# Patient Record
Sex: MALE | Race: WHITE | NOT HISPANIC OR LATINO | URBAN - METROPOLITAN AREA
[De-identification: names, ages, dates, MRNs, and addresses within clinical notes are randomized per-mention and may not be internally consistent; named-entity substitution may affect disease eponyms.]

---

## 2023-02-08 ENCOUNTER — TELEPHONE (OUTPATIENT)
Dept: UROLOGY | Facility: AMBULATORY SURGERY CENTER | Age: 63
End: 2023-02-08

## 2023-02-08 NOTE — TELEPHONE ENCOUNTER
Please Triage  New Patient    What is the reason for the patient’s appointment? Pt is following Dr Joceline Snyder   Patient has an elevated PSA     What office location does the patient prefer? [facility]    Imaging/Lab Results:    Do we accept the patient's insurance or is the patient Self-Pay? Insurance Provider: [employer]  Plan Type/Number:  Member ID#: Has the patient had any previous Urologist(s)? Yes [facility]    Have patient records been requested? If not are records showing in Epic: epic    Has the patient had any outside testing done? Does the patient have a personal history of cancer?  no

## 2023-04-20 PROBLEM — R97.20 ELEVATED PSA: Status: ACTIVE | Noted: 2023-04-20

## 2023-04-20 PROBLEM — N40.1 BENIGN PROSTATIC HYPERPLASIA WITH NOCTURIA: Status: ACTIVE | Noted: 2023-04-20

## 2023-04-20 PROBLEM — R35.1 BENIGN PROSTATIC HYPERPLASIA WITH NOCTURIA: Status: ACTIVE | Noted: 2023-04-20

## 2023-10-20 ENCOUNTER — TELEPHONE (OUTPATIENT)
Dept: UROLOGY | Facility: CLINIC | Age: 63
End: 2023-10-20

## 2023-10-25 ENCOUNTER — OFFICE VISIT (OUTPATIENT)
Dept: UROLOGY | Facility: CLINIC | Age: 63
End: 2023-10-25
Payer: COMMERCIAL

## 2023-10-25 VITALS
HEIGHT: 71 IN | SYSTOLIC BLOOD PRESSURE: 120 MMHG | HEART RATE: 67 BPM | BODY MASS INDEX: 37.1 KG/M2 | OXYGEN SATURATION: 99 % | DIASTOLIC BLOOD PRESSURE: 78 MMHG | TEMPERATURE: 97.6 F | RESPIRATION RATE: 18 BRPM | WEIGHT: 265 LBS

## 2023-10-25 DIAGNOSIS — R97.20 ELEVATED PSA: Primary | ICD-10-CM

## 2023-10-25 DIAGNOSIS — N40.1 BENIGN PROSTATIC HYPERPLASIA WITH NOCTURIA: ICD-10-CM

## 2023-10-25 DIAGNOSIS — R35.1 BENIGN PROSTATIC HYPERPLASIA WITH NOCTURIA: ICD-10-CM

## 2023-10-25 PROCEDURE — 99213 OFFICE O/P EST LOW 20 MIN: CPT | Performed by: UROLOGY

## 2023-10-25 RX ORDER — TADALAFIL 5 MG/1
5 TABLET ORAL DAILY PRN
Qty: 90 TABLET | Refills: 3 | Status: SHIPPED | OUTPATIENT
Start: 2023-10-25

## 2023-10-25 NOTE — PROGRESS NOTES
UROLOGY PROGRESS NOTE         NAME: Sherron Coley  AGE: 66 y.o. SEX: male  : 1960   MRN: [id number]    DATE: 10/25/2023  TIME: 10:41 AM    Assessment and Plan      Impression:   1. Elevated PSA  -     PSA Total, Diagnostic; Future  -     PSA Total, Diagnostic; Future; Expected date: 2024  -     tadalafil (CIALIS) 5 MG tablet; Take 1 tablet (5 mg total) by mouth daily as needed for erectile dysfunction    2. Benign prostatic hyperplasia with nocturia  -     PSA Total, Diagnostic; Future  -     PSA Total, Diagnostic; Future; Expected date: 2024  -     tadalafil (CIALIS) 5 MG tablet; Take 1 tablet (5 mg total) by mouth daily as needed for erectile dysfunction    PSA down to 9.2. Doing well from a urologic standpoint with erectile dysfunction and BPH.  Plan: He will continue to monitor his PSA twice a year we will see him in 1 year. Consider an additional MRI of the prostate before biopsies. Chief Complaint     Chief Complaint   Patient presents with    Follow-up     23 PSA done it was 9.2     History of Present Illness     HPI: Sherron Coley is a 66y.o. year old male who presents with history of elevated PSA history of family history of prostate cancer. History of negative prostate biopsies in 2019. History of a negative MRI a year and a half ago. Doing well clinically on Cialis 5 mg daily. He is able to reach orgasm but has trouble reaching it. That is his main complaint. He has nocturia once a night. His flow is better. His urgency is a little better. He feels that he empties. Jaycee Angles PSA was 10.2 now down to 9.2.             The following portions of the patient's history were reviewed and updated as appropriate: allergies, current medications, past family history, past medical history, past social history, past surgical history and problem list.  Past Medical History:   Diagnosis Date    Benign prostatic hyperplasia     Elevated PSA      Past Surgical History: Procedure Laterality Date    COLONOSCOPY W/ POLYPECTOMY       shoulder  Review of Systems     Const: Denies chills, fever and weight loss. CV: Denies chest pain. Resp: Denies SOB. GI: Denies abdominal pain, nausea and vomiting. : Denies symptoms other than stated above. Musculo: Denies back pain. Objective   /78 (BP Location: Left arm, Patient Position: Sitting, Cuff Size: Large)   Pulse 67   Temp 97.6 °F (36.4 °C) (Tympanic)   Resp 18   Ht 5' 11" (1.803 m)   Wt 120 kg (265 lb)   SpO2 99%   BMI 36.96 kg/m²     Physical Exam  Const: Appears healthy and well developed. No signs of acute distress present. Resp: Respirations are regular and unlabored. CV: Rate is regular. Rhythm is regular. Abdomen: Abdomen is soft, nontender, and nondistended. Kidneys are not palpable. : Normal penis testicles epididymis and scrotum. No hernias. Prostate 1+ benign. Psych: Patient's attitude is cooperative.  Mood is normal. Affect is normal.    Current Medications     Current Outpatient Medications:     atorvastatin (LIPITOR) 20 mg tablet, Take 20 mg by mouth daily, Disp: , Rfl:     lisinopril (ZESTRIL) 10 mg tablet, Take 10 mg by mouth daily, Disp: , Rfl:     tadalafil (CIALIS) 5 MG tablet, Take 1 tablet (5 mg total) by mouth daily as needed for erectile dysfunction, Disp: 90 tablet, Rfl: 3    metroNIDAZOLE (METROGEL) 1 % gel, Apply topically (Patient not taking: Reported on 10/25/2023), Disp: , Rfl:         Harjit Olmos MD

## 2024-01-31 ENCOUNTER — TELEPHONE (OUTPATIENT)
Age: 64
End: 2024-01-31

## 2024-01-31 NOTE — TELEPHONE ENCOUNTER
Patient's PCP office called to request a copy of the most recent office visit note of 10/25/2023.    Pt signed a consent for release of records which will be faxed by the office to us.    Fax [phone]

## 2024-04-30 ENCOUNTER — TELEPHONE (OUTPATIENT)
Dept: UROLOGY | Facility: CLINIC | Age: 64
End: 2024-04-30

## 2024-05-01 NOTE — TELEPHONE ENCOUNTER
Spoke with pt, he will get his psa closer to his next appt. Aware Dr. D'Amico would like it twice a year. Verbalized understanding. Nothing else needed at this time.

## 2024-05-01 NOTE — TELEPHONE ENCOUNTER
Patient is wondering if doctor needs this done right away or closer to his next apt. He states his ins will only cover it once a year.     CB: [phone]

## 2024-11-06 ENCOUNTER — OFFICE VISIT (OUTPATIENT)
Dept: UROLOGY | Facility: CLINIC | Age: 64
End: 2024-11-06
Payer: COMMERCIAL

## 2024-11-06 VITALS
HEIGHT: 71 IN | WEIGHT: 268.4 LBS | HEART RATE: 66 BPM | TEMPERATURE: 98 F | DIASTOLIC BLOOD PRESSURE: 70 MMHG | BODY MASS INDEX: 37.57 KG/M2 | SYSTOLIC BLOOD PRESSURE: 128 MMHG | OXYGEN SATURATION: 99 %

## 2024-11-06 DIAGNOSIS — R97.20 ELEVATED PSA: ICD-10-CM

## 2024-11-06 DIAGNOSIS — R35.1 BENIGN PROSTATIC HYPERPLASIA WITH NOCTURIA: Primary | ICD-10-CM

## 2024-11-06 DIAGNOSIS — N40.1 BENIGN PROSTATIC HYPERPLASIA WITH NOCTURIA: Primary | ICD-10-CM

## 2024-11-06 LAB
SL AMB  POCT GLUCOSE, UA: NORMAL
SL AMB LEUKOCYTE ESTERASE,UA: NORMAL
SL AMB POCT BILIRUBIN,UA: NORMAL
SL AMB POCT BLOOD,UA: NORMAL
SL AMB POCT CLARITY,UA: CLEAR
SL AMB POCT COLOR,UA: YELLOW
SL AMB POCT KETONES,UA: NORMAL
SL AMB POCT NITRITE,UA: NORMAL
SL AMB POCT PH,UA: 6
SL AMB POCT SPECIFIC GRAVITY,UA: >=1.03
SL AMB POCT URINE PROTEIN: NORMAL
SL AMB POCT UROBILINOGEN: 0.2

## 2024-11-06 PROCEDURE — 81003 URINALYSIS AUTO W/O SCOPE: CPT | Performed by: UROLOGY

## 2024-11-06 PROCEDURE — 99214 OFFICE O/P EST MOD 30 MIN: CPT | Performed by: UROLOGY

## 2024-11-06 RX ORDER — LISINOPRIL AND HYDROCHLOROTHIAZIDE 10; 12.5 MG/1; MG/1
1 TABLET ORAL DAILY
COMMUNITY
Start: 2024-09-15

## 2024-11-06 NOTE — PROGRESS NOTES
UROLOGY PROGRESS NOTE         NAME: Terry Etzweiler  AGE: 64 y.o. SEX: male  : 1960   MRN: [id number]    DATE: 2024  TIME: 11:21 AM    Assessment and Plan      Impression:   1. Benign prostatic hyperplasia with nocturia  -     POCT urine dip auto non-scope  2. Elevated PSA  Patient's PSA has been slowly rising.  Now 12.5.  Options discussed.     Plan: Repeat MRI and consider biopsies based on that study.  Continue Cialis 5 mg daily    Chief Complaint     Chief Complaint   Patient presents with    Elevated PSA    Follow-up     History of Present Illness     HPI: Terry Etzweiler is a 64 y.o. year old male who presents with history of negative biopsies in .  PSA was rising some.  PSA 6.5 in .  MRI at that time revealed a PI-RADS score of 2.  Prostate volume of 65 cc.  He has been followed along expectantly.  He does have some ejaculatory dysfunction.  He has been on some Cialis which does help his erections.  And helps his nocturia little bit.  He has nocturia 0-1 time per night.  Voids every few hours during the day.  No significant voiding dysfunction.  No urgency.  No hematuria.  Most recent PSA 12.5.  This is up from 10.0  Currently on Cialis 5 mg daily              The following portions of the patient's history were reviewed and updated as appropriate: allergies, current medications, past family history, past medical history, past social history, past surgical history and problem list.  Past Medical History:   Diagnosis Date    Benign prostatic hyperplasia     Elevated PSA     High cholesterol      Past Surgical History:   Procedure Laterality Date    ARTHROSCOPIC REPAIR ACL Left     COLONOSCOPY W/ POLYPECTOMY       shoulder  Review of Systems     Const: Denies chills, fever and weight loss.  CV: Denies chest pain.  Resp: Denies SOB.  GI: Denies abdominal pain, nausea and vomiting.  : Denies symptoms other than stated above.  Musculo: Denies back pain.    Objective   /70    "Pulse 66   Temp 98 °F (36.7 °C)   Ht 5' 11\" (1.803 m)   Wt 122 kg (268 lb 6.4 oz)   SpO2 99%   BMI 37.43 kg/m²     Physical Exam  Const: Appears healthy and well developed. No signs of acute distress present.  Resp: Respirations are regular and unlabored.   CV: Rate is regular. Rhythm is regular.  Abdomen: Abdomen is soft, nontender, and nondistended. Kidneys are not palpable.  : Prostate 1+ benign.  Psych: Patient's attitude is cooperative. Mood is normal. Affect is normal.    Current Medications     Current Outpatient Medications:     atorvastatin (LIPITOR) 20 mg tablet, Take 20 mg by mouth daily, Disp: , Rfl:     lisinopril (ZESTRIL) 10 mg tablet, Take 10 mg by mouth daily, Disp: , Rfl:     lisinopril-hydrochlorothiazide (PRINZIDE,ZESTORETIC) 10-12.5 MG per tablet, Take 1 tablet by mouth daily, Disp: , Rfl:     tadalafil (CIALIS) 5 MG tablet, Take 1 tablet (5 mg total) by mouth daily as needed for erectile dysfunction (Patient taking differently: Take 5 mg by mouth in the morning), Disp: 90 tablet, Rfl: 3    metroNIDAZOLE (METROGEL) 1 % gel, Apply topically (Patient not taking: Reported on 10/25/2023), Disp: , Rfl:         Frank D'Amico, MD        "

## 2025-01-21 ENCOUNTER — TELEPHONE (OUTPATIENT)
Age: 65
End: 2025-01-21

## 2025-01-21 DIAGNOSIS — R35.1 BENIGN PROSTATIC HYPERPLASIA WITH NOCTURIA: Primary | ICD-10-CM

## 2025-01-21 DIAGNOSIS — N40.1 BENIGN PROSTATIC HYPERPLASIA WITH NOCTURIA: Primary | ICD-10-CM

## 2025-01-21 NOTE — TELEPHONE ENCOUNTER
Patient was returning a call to the office. Per the previous encounter, relay ed the message to the patient.     Could not give him further details because the orders were no in the chart for the labs. Can those please be added?    Patient will try to get the labs done tomorrow. He can not get it prior to the MRI because the appt is first thing in the morning around 7 am

## 2025-01-21 NOTE — TELEPHONE ENCOUNTER
Called and left message for patient to call the office back. Please relay message to patient. Patient would need to have his blood work done prior to the MRI. Please relay message to patient. Patient can try and ask if he can have lab work done earlier the day of his MRI. This is a requirement of where he is having the MRI done at. IF he is unable to have the lab work done he will have to reschedule the MRI and follow up appointment.

## 2025-01-21 NOTE — TELEPHONE ENCOUNTER
Patient returned missed call and stated that he rescheduled the MRI to 4/29 due to not having enough time to complete blood work.    Pt states he is having the MRI with [place] [facility].

## 2025-01-21 NOTE — TELEPHONE ENCOUNTER
Patrice calling as patient is scheduled on 01/23/2025 for MRI prostate multiparametric wo w contrast (Order [id number])   Patient was not aware he needed blood work prior to this test and has not had it done an dis unable to get it done due to his job-per Patrice.     Patrice inquiring if there is any alternative or if we need to reschedule. Please advise.     Patrice- [facility] union deposit phone number: [phone]

## 2025-01-21 NOTE — TELEPHONE ENCOUNTER
Left message for pt to return call, he needs to have a BUN/Creat drawn before his MRI. Where is he having the MRI done? I realize he is having the MRI at 7am but maybe he can call them and explain that he will need a later appt if they require the labs be done prior. Or he can go for the labs today? Or he will most likely need to r/s his MRI appt.     I have placed the orders in his chart.

## 2025-01-22 NOTE — TELEPHONE ENCOUNTER
Pt really does not want to have to travel to [place] or [place] for the MRI because its 2 hrs from him .  Do they do the MRI in [place] [employer]?  He will go there is possible.      Right now he is scheduled at [facility] for April 29th.

## 2025-01-22 NOTE — TELEPHONE ENCOUNTER
Pt scheduled for MRI with [facility] on 2/11, and virtual visit on 2/18. Pt works at [employer] he would like to make sure nurse gives him time to get outside to have call it is very loud where he works.

## 2025-01-22 NOTE — TELEPHONE ENCOUNTER
He is fairly young in age. PSA rising, now up to 12.  was 6.5 in 2021, MRI then was PI-RADS 2, prostate volume 65 cc.  Also had a history of negative biopsies.  On Cialis for some ejac dysfunction.     Is this study rescheduled to a convenient time for him? Or, is this something the facility is having trouble accommodating?

## 2025-01-22 NOTE — TELEPHONE ENCOUNTER
Left message for pt to call office back. Usually [facility] mri are scheduled pretty far out. Please find out if pt would like to have MRI done in [facility].

## 2025-02-11 ENCOUNTER — HOSPITAL ENCOUNTER (OUTPATIENT)
Dept: MRI IMAGING | Facility: HOSPITAL | Age: 65
Discharge: HOME | End: 2025-02-11
Attending: UROLOGY
Payer: COMMERCIAL

## 2025-02-11 DIAGNOSIS — R35.1 BENIGN PROSTATIC HYPERPLASIA WITH NOCTURIA: ICD-10-CM

## 2025-02-11 DIAGNOSIS — N40.1 BENIGN PROSTATIC HYPERPLASIA WITH NOCTURIA: ICD-10-CM

## 2025-02-11 DIAGNOSIS — R97.20 ELEVATED PSA: ICD-10-CM

## 2025-02-11 PROCEDURE — 72197 MRI PELVIS W/O & W/DYE: CPT

## 2025-02-11 PROCEDURE — A9585 GADOBUTROL INJECTION: HCPCS | Performed by: UROLOGY

## 2025-02-11 PROCEDURE — 76377 3D RENDER W/INTRP POSTPROCES: CPT

## 2025-02-11 RX ORDER — GADOBUTROL 604.72 MG/ML
12 INJECTION INTRAVENOUS
Status: COMPLETED | OUTPATIENT
Start: 2025-02-11 | End: 2025-02-11

## 2025-02-11 RX ADMIN — GADOBUTROL 12 ML: 604.72 INJECTION INTRAVENOUS at 13:07

## 2025-02-14 ENCOUNTER — RESULTS FOLLOW-UP (OUTPATIENT)
Dept: UROLOGY | Facility: CLINIC | Age: 65
End: 2025-02-14

## 2025-02-14 NOTE — TELEPHONE ENCOUNTER
----- Message from Frank D'Amico, MD sent at 2/14/2025 12:57 PM EST -----  Please give him a copy of his MRI.  We will discuss at his visit next week.  He has a phi RADS score of 3 giving him intermediate risk for having prostate cancer.  He should keep his appointment.  Thank you  ----- Message -----  From: Interface, Radiology Results In  Sent: 2/12/2025  11:36 PM EST  To: Frank D'Amico, MD

## 2025-02-18 ENCOUNTER — TELEPHONE (OUTPATIENT)
Age: 65
End: 2025-02-18

## 2025-02-18 ENCOUNTER — TELEMEDICINE (OUTPATIENT)
Dept: UROLOGY | Facility: CLINIC | Age: 65
End: 2025-02-18
Payer: COMMERCIAL

## 2025-02-18 DIAGNOSIS — R97.20 ELEVATED PSA: Primary | ICD-10-CM

## 2025-02-18 PROCEDURE — 99213 OFFICE O/P EST LOW 20 MIN: CPT | Performed by: UROLOGY

## 2025-02-18 NOTE — TELEPHONE ENCOUNTER
Patient was calling in because he stated he missed a call from the office again. He tried to answer but it would not connect.     Placed patient on hold to reach out to the office via teams. Patient disconnected call because I could even ask the office if they called him again.

## 2025-02-18 NOTE — Clinical Note
Myranda please set him up for me to do the biopsies here in [place].  Transrectal ultrasound with prostate biopsies.  Sedation.  Visit with me 1 week later to discuss results.

## 2025-02-18 NOTE — PROGRESS NOTES
UROLOGY PROGRESS NOTE         NAME: Terry Etzweiler  AGE: 64 y.o. SEX: male  : 1960   MRN: [id number]    DATE: 2025  TIME: 2:20 PM    Assessment and Plan      Impression:   1. Elevated PSA  -     Case request operating room: TRANSRECTAL NEEDLE BIOPSY PROSTATE (TRNBP); Standing  -     Case request operating room: TRANSRECTAL NEEDLE BIOPSY PROSTATE (TRNBP)  Virtual visit with the patient.  PSA is elevated.  He has a phi RADS score and his MRI of 3.  Previous biopsies negative.  He does have an elevated risk for having prostate cancer based on his rising PSA and MRI.  He is agreeable to having repeat biopsies.  We will do a cognitive fusion with random biopsies.  Risks discussed.  Will do this with sedation at the hospital.     Plan: Transrectal ultrasound of the prostate with ultrasound-guided biopsy of the prostate with a cognitive fusion.      Chief Complaint     Chief Complaint   Patient presents with    Benign Prostatic Hypertrophy     History of Present Illness     HPI: Terry Etzweiler is a 64 y.o. year old male who presents with history of an elevated PSA now up to 12.  PI-RADS score of 3.  Prostate volume roughly the same as previously.  Given these findings I do believe would be best to proceed with an additional set of biopsies.  After discussing options we will get this organized to the hospital with sedation.    Patient was at home I was in the office.  Total time looking and reviewing chart and discussing with patient of 20 minutes.              The following portions of the patient's history were reviewed and updated as appropriate: allergies, current medications, past family history, past medical history, past social history, past surgical history and problem list.  Past Medical History:   Diagnosis Date    Benign prostatic hyperplasia     Elevated PSA     High cholesterol      Past Surgical History:   Procedure Laterality Date    ARTHROSCOPIC REPAIR ACL Left     COLONOSCOPY W/  POLYPECTOMY       shoulder  Review of Systems     Const: Denies chills, fever and weight loss.  CV: Denies chest pain.  Resp: Denies SOB.  GI: Denies abdominal pain, nausea and vomiting.  : Denies symptoms other than stated above.  Musculo: Denies back pain.    Objective   There were no vitals taken for this visit.    Physical Exam  Const: Appears healthy and well developed. No signs of acute distress present.  Resp: Respirations are regular and unlabored.   Psych: Patient's attitude is cooperative. Mood is normal. Affect is normal.    Current Medications     Current Outpatient Medications:     atorvastatin (LIPITOR) 20 mg tablet, Take 20 mg by mouth daily, Disp: , Rfl:     lisinopril-hydrochlorothiazide (PRINZIDE,ZESTORETIC) 10-12.5 MG per tablet, Take 1 tablet by mouth daily, Disp: , Rfl:     tadalafil (CIALIS) 5 MG tablet, Take 1 tablet (5 mg total) by mouth daily as needed for erectile dysfunction, Disp: 90 tablet, Rfl: 3        Frank D'Amico, MD

## 2025-02-18 NOTE — TELEPHONE ENCOUNTER
Pt called because there was an issue with hearing him when the office called for his virtual visit.  I reached out to the office and they stated they will call pt once their phones are working.  Pt verbalized understanding.

## 2025-02-20 ENCOUNTER — PREP FOR PROCEDURE (OUTPATIENT)
Dept: UROLOGY | Facility: CLINIC | Age: 65
End: 2025-02-20

## 2025-02-20 ENCOUNTER — TELEPHONE (OUTPATIENT)
Dept: UROLOGY | Facility: CLINIC | Age: 65
End: 2025-02-20

## 2025-02-20 DIAGNOSIS — Z01.818 ENCOUNTER FOR PREADMISSION TESTING: Primary | ICD-10-CM

## 2025-02-20 DIAGNOSIS — R39.89 SUSPECTED UTI: ICD-10-CM

## 2025-02-20 NOTE — TELEPHONE ENCOUNTER
Spoke with patient and confirmed surgery date of: 3/26  Type of surgery: prostate biopsy  Operating physician: Dr. D'Amico  Location of surgery: [facility]    Verbally went over prep with patient on: 2/20  NPO  Bowel prep? No  Hospital calls afternoon prior with arrival time -Calls Friday afternoon for Monday surgeries  Patient needs ride to and from surgery (outpatient)   Pre-op testing to be done 2 weeks prior to surgery  (CBC, BMP, UC, EKG)  Blood thinners: N/A  Clearances needed: (None)    Mailed to patient on:  Copy of packet scanned into Media on:  Labs in packet  Soap / Bowel prep in packet  Post-op in packet  Date 4/3    Consent: on admit

## 2025-03-25 ENCOUNTER — ANESTHESIA EVENT (OUTPATIENT)
Dept: PERIOP | Facility: HOSPITAL | Age: 65
End: 2025-03-25
Payer: COMMERCIAL

## 2025-03-25 PROBLEM — I10 HTN (HYPERTENSION): Status: ACTIVE | Noted: 2025-03-25

## 2025-03-25 PROBLEM — G81.90 HEMIPLEGIA (HCC): Status: ACTIVE | Noted: 2025-03-25

## 2025-03-25 PROBLEM — E78.5 HYPERLIPIDEMIA: Status: ACTIVE | Noted: 2025-03-25

## 2025-03-26 ENCOUNTER — ANESTHESIA (OUTPATIENT)
Dept: PERIOP | Facility: HOSPITAL | Age: 65
End: 2025-03-26
Payer: COMMERCIAL

## 2025-03-26 ENCOUNTER — HOSPITAL ENCOUNTER (OUTPATIENT)
Facility: HOSPITAL | Age: 65
Setting detail: OUTPATIENT SURGERY
Discharge: HOME | End: 2025-03-26
Attending: UROLOGY | Admitting: UROLOGY
Payer: COMMERCIAL

## 2025-03-26 ENCOUNTER — HOSPITAL ENCOUNTER (OUTPATIENT)
Dept: ULTRASOUND IMAGING | Facility: HOSPITAL | Age: 65
Discharge: HOME | End: 2025-03-26
Admitting: RADIOLOGY
Payer: COMMERCIAL

## 2025-03-26 VITALS
TEMPERATURE: 97.8 F | WEIGHT: 260 LBS | DIASTOLIC BLOOD PRESSURE: 91 MMHG | RESPIRATION RATE: 21 BRPM | BODY MASS INDEX: 36.4 KG/M2 | SYSTOLIC BLOOD PRESSURE: 147 MMHG | OXYGEN SATURATION: 95 % | HEART RATE: 69 BPM | HEIGHT: 71 IN

## 2025-03-26 DIAGNOSIS — R97.20 ELEVATED PROSTATE SPECIFIC ANTIGEN (PSA): ICD-10-CM

## 2025-03-26 DIAGNOSIS — R97.20 ELEVATED PSA: ICD-10-CM

## 2025-03-26 PROCEDURE — 76942 ECHO GUIDE FOR BIOPSY: CPT

## 2025-03-26 PROCEDURE — NC001 PR NO CHARGE: Performed by: UROLOGY

## 2025-03-26 PROCEDURE — 76942 ECHO GUIDE FOR BIOPSY: CPT | Performed by: UROLOGY

## 2025-03-26 PROCEDURE — 55700 PR PROSTATE NEEDLE BIOPSY ANY APPROACH: CPT | Performed by: UROLOGY

## 2025-03-26 PROCEDURE — G0416 PROSTATE BIOPSY, ANY MTHD: HCPCS | Performed by: PATHOLOGY

## 2025-03-26 PROCEDURE — 55700 HB BIOPSY OF PROSTATE: CPT

## 2025-03-26 RX ORDER — CIPROFLOXACIN 500 MG/1
500 TABLET, FILM COATED ORAL ONCE
Status: COMPLETED | OUTPATIENT
Start: 2025-03-26 | End: 2025-03-26

## 2025-03-26 RX ORDER — MIDAZOLAM HYDROCHLORIDE 2 MG/2ML
INJECTION, SOLUTION INTRAMUSCULAR; INTRAVENOUS AS NEEDED
Status: DISCONTINUED | OUTPATIENT
Start: 2025-03-26 | End: 2025-03-26

## 2025-03-26 RX ORDER — LIDOCAINE HYDROCHLORIDE 10 MG/ML
INJECTION, SOLUTION EPIDURAL; INFILTRATION; INTRACAUDAL; PERINEURAL AS NEEDED
Status: DISCONTINUED | OUTPATIENT
Start: 2025-03-26 | End: 2025-03-26

## 2025-03-26 RX ORDER — FENTANYL CITRATE/PF 50 MCG/ML
25 SYRINGE (ML) INJECTION
Status: DISCONTINUED | OUTPATIENT
Start: 2025-03-26 | End: 2025-03-26 | Stop reason: HOSPADM

## 2025-03-26 RX ORDER — ONDANSETRON 2 MG/ML
4 INJECTION INTRAMUSCULAR; INTRAVENOUS ONCE AS NEEDED
Status: DISCONTINUED | OUTPATIENT
Start: 2025-03-26 | End: 2025-03-26 | Stop reason: HOSPADM

## 2025-03-26 RX ORDER — SODIUM CHLORIDE, SODIUM LACTATE, POTASSIUM CHLORIDE, CALCIUM CHLORIDE 600; 310; 30; 20 MG/100ML; MG/100ML; MG/100ML; MG/100ML
125 INJECTION, SOLUTION INTRAVENOUS CONTINUOUS
Status: DISCONTINUED | OUTPATIENT
Start: 2025-03-26 | End: 2025-03-26 | Stop reason: HOSPADM

## 2025-03-26 RX ORDER — PROPOFOL 10 MG/ML
INJECTION, EMULSION INTRAVENOUS CONTINUOUS PRN
Status: DISCONTINUED | OUTPATIENT
Start: 2025-03-26 | End: 2025-03-26

## 2025-03-26 RX ORDER — ONDANSETRON 2 MG/ML
INJECTION INTRAMUSCULAR; INTRAVENOUS AS NEEDED
Status: DISCONTINUED | OUTPATIENT
Start: 2025-03-26 | End: 2025-03-26

## 2025-03-26 RX ORDER — SODIUM CHLORIDE, SODIUM LACTATE, POTASSIUM CHLORIDE, CALCIUM CHLORIDE 600; 310; 30; 20 MG/100ML; MG/100ML; MG/100ML; MG/100ML
INJECTION, SOLUTION INTRAVENOUS CONTINUOUS PRN
Status: DISCONTINUED | OUTPATIENT
Start: 2025-03-26 | End: 2025-03-26

## 2025-03-26 RX ADMIN — ONDANSETRON 4 MG: 2 INJECTION INTRAMUSCULAR; INTRAVENOUS at 14:23

## 2025-03-26 RX ADMIN — LIDOCAINE HYDROCHLORIDE 50 MG: 10 INJECTION, SOLUTION EPIDURAL; INFILTRATION; INTRACAUDAL; PERINEURAL at 14:23

## 2025-03-26 RX ADMIN — SODIUM CHLORIDE, SODIUM LACTATE, POTASSIUM CHLORIDE, AND CALCIUM CHLORIDE: .6; .31; .03; .02 INJECTION, SOLUTION INTRAVENOUS at 14:20

## 2025-03-26 RX ADMIN — SODIUM CHLORIDE 8 MCG: 9 INJECTION, SOLUTION INTRAVENOUS at 14:23

## 2025-03-26 RX ADMIN — SODIUM CHLORIDE 4 MCG: 9 INJECTION, SOLUTION INTRAVENOUS at 14:33

## 2025-03-26 RX ADMIN — CEFAZOLIN 3000 MG: 1 INJECTION, POWDER, FOR SOLUTION INTRAMUSCULAR; INTRAVENOUS at 14:24

## 2025-03-26 RX ADMIN — MIDAZOLAM 2 MG: 1 INJECTION INTRAMUSCULAR; INTRAVENOUS at 14:18

## 2025-03-26 RX ADMIN — SODIUM CHLORIDE 4 MCG: 9 INJECTION, SOLUTION INTRAVENOUS at 14:27

## 2025-03-26 RX ADMIN — PROPOFOL 100 MCG/KG/MIN: 10 INJECTION, EMULSION INTRAVENOUS at 14:24

## 2025-03-26 RX ADMIN — CIPROFLOXACIN 500 MG: 500 TABLET ORAL at 15:05

## 2025-03-26 NOTE — H&P
"H&P Exam - Urology   Terry Etzweiler 64 y.o. male MRN: [id number]  Unit/Bed#: OR POOL Encounter: [id number]    Assessment & Plan     Assessment:  Elevated PSA.  Plan:  Transrectal ultrasound with cognitive fusion biopsy.  Left mid medial prostate.  And random biopsies.    History of Present Illness   HPI:  Terry Etzweiler is a 64 y.o. male who presents with history of elevated PSA.  History of PI-RADS score of 3 on a recent MRI.  Plan is to perform a cognitive fusion of his biopsies.  Pros cons options risk discussed with patient.  We will do this with sedation in the operating room.  He had a 1 cm lesion in the left mid medial prostate.  60 cc prostate gland..    Review of Systems:  Const: Denies chills, fever and weight loss.  CV: Denies chest pain.  Resp: Denies SOB.  GI: Denies abdominal pain, nausea and vomiting.  : Denies symptoms other than stated above.  Musculo: Denies back pain.    Historical Information   Past Medical History:   Diagnosis Date    Benign prostatic hyperplasia     Elevated PSA     High cholesterol     Hypertension      Past Surgical History:   Procedure Laterality Date    ARTHROSCOPIC REPAIR ACL Left 1996    COLONOSCOPY W/ POLYPECTOMY       Social History   Social History     Substance and Sexual Activity   Alcohol Use Never     Social History     Substance and Sexual Activity   Drug Use Never     Social History     Tobacco Use   Smoking Status Never   Smokeless Tobacco Never     E-Cigarette/Vaping    E-Cigarette Use Never User      E-Cigarette/Vaping Substances    Nicotine No     THC No     CBD No     Flavoring No     Other No     Unknown No      Family History: non-contributory    Meds/Allergies   all medications and allergies reviewed  No Known Allergies    Objective   Vitals: Blood pressure (!) 172/98, pulse 72, temperature 97.5 °F (36.4 °C), temperature source Temporal, resp. rate 18, height 5' 11\" (1.803 m), weight 118 kg (260 lb), SpO2 96%.    No intake/output data " recorded.    Invasive Devices       Peripheral Intravenous Line  Duration             Peripheral IV 03/26/25 Dorsal (posterior);Left Wrist <1 day                    Physical Exam:  Physical Exam  Const: Appears healthy and well developed. No signs of acute distress present.  Resp: Respirations are regular and unlabored.   CV: Rate is regular. Rhythm is regular.  Abdomen: Abdomen is soft, nontender, and nondistended. Kidneys are not palpable.  :   Psych: Patient's attitude is cooperative. Mood is normal. Affect is normal.    Lab Results: I have personally reviewed pertinent reports.    Imaging: I have personally reviewed pertinent reports.   and I have personally reviewed pertinent films in PACS  EKG, Pathology, and Other Studies: I have personally reviewed pertinent reports.   and I have personally reviewed pertinent films in PACS  VTE Prophylaxis: Sequential compression device (Venodyne)     Code Status: No Order  Advance Directive and Living Will:      Power of :    POLST:      Counseling / Coordination of Care  Total floor / unit time spent today 15 minutes.  Greater than 50% of total time was spent with the patient and / or family counseling and / or coordination of care.  A description of the counseling / coordination of care: .

## 2025-03-26 NOTE — ANESTHESIA POSTPROCEDURE EVALUATION
Post-Op Assessment Note    CV Status:  Stable  Pain Score: 0    Pain management: adequate       Mental Status:  Alert   Hydration Status:  Stable   PONV Controlled:  None   Airway Patency:  Patent     Post Op Vitals Reviewed: Yes    No anethesia notable event occurred.    Staff: Anesthesiologist           Last Filed PACU Vitals:  Vitals Value Taken Time   Temp 97.6 °F (36.4 °C) 03/26/25 1442   Pulse 82 03/26/25 1455   /75 03/26/25 1452   Resp 24 03/26/25 1455   SpO2 94 % 03/26/25 1455   Vitals shown include unfiled device data.    Modified [place]:     Vitals Value Taken Time   Activity 0 03/26/25 1442   Respiration 1 03/26/25 1442   Circulation 1 03/26/25 1442   Consciousness 1 03/26/25 1442   Oxygen Saturation 1 03/26/25 1442     Modified [place] Score: 4

## 2025-03-26 NOTE — ANESTHESIA PREPROCEDURE EVALUATION
"Procedure:  TRANSRECTAL NEEDLE BIOPSY PROSTATE (TRNBP) (Bilateral: Abdomen)    Relevant Problems   ANESTHESIA (within normal limits)      CARDIO   (+) HTN (hypertension)   (+) Hyperlipidemia   (-) Angina at rest (HCC)   (-) Angina of effort (HCC)   (-) KHOURY (dyspnea on exertion)      ENDO (within normal limits)      GI/HEPATIC (within normal limits)      /RENAL (within normal limits)      HEMATOLOGY (within normal limits)      MUSCULOSKELETAL (within normal limits)      PULMONARY (within normal limits)   (-) URI (upper respiratory infection)      No results found for: \"WBC\", \"HGB\", \"HCT\", \"MCV\", \"PLT\"  Lab Results   Component Value Date    SODIUM 140 03/17/2025    K 4.3 03/17/2025    CO2 28 03/17/2025    BUN 14 03/17/2025    CREATININE 1.1 03/17/2025    GLUC 91 03/17/2025    CALCIUM 9.6 03/17/2025    AST 16 01/29/2025    ALT 23 01/29/2025    ALKPHOS 69 01/29/2025    TP 6.6 01/29/2025    TBILI 0.4 01/29/2025    EGFR 72 03/17/2025       Physical Exam    Airway    Mallampati score: IV  TM Distance: >3 FB  Neck ROM: full     Dental   No notable dental hx     Cardiovascular  Rhythm: regular, Rate: normal    Pulmonary   Breath sounds clear to auscultation    Other Findings        Anesthesia Plan  ASA Score- 2     Anesthesia Type- IV sedation with anesthesia with ASA Monitors.         Additional Monitors:     Airway Plan:            Plan Factors-Exercise tolerance (METS): >4 METS.    Chart reviewed.        Patient is not a current smoker.      Obstructive sleep apnea risk education given perioperatively.        Induction- intravenous.    Postoperative Plan- Plan for postoperative opioid use.     Perioperative Resuscitation Plan - Level 1 - Full Code.       Informed Consent- Anesthetic plan and risks discussed with patient.  I personally reviewed this patient with the CRNA. Discussed and agreed on the Anesthesia Plan with the CRNA..      NPO Status:  No vitals data found for the desired time range.        "

## 2025-03-26 NOTE — ANESTHESIA POSTPROCEDURE EVALUATION
Post-Op Assessment Note    CV Status:  Stable    Pain management: satisfactory to patient       Mental Status:  Sleepy   Hydration Status:  Stable   PONV Controlled:  None   Airway Patency:  Patent     Post Op Vitals Reviewed: Yes    No anethesia notable event occurred.    Staff: CRNA           Last Filed PACU Vitals:  Vitals Value Taken Time   Temp 97.6    Pulse 76    BP 99/56    Resp 14    SpO2 99

## 2025-03-26 NOTE — OP NOTE
OPERATIVE REPORT  PATIENT NAME: Terry Etzweiler    :  1960  MRN: [id number]  Pt Location: [facility] OR ROOM 02    SURGERY DATE: 3/26/2025    Surgeons and Role:     * Frank D'Amico, MD - Primary    Preop Diagnosis:  Elevated PSA [R97.20]    Post-Op Diagnosis Codes:     * Elevated PSA [R97.20]    Procedure(s):  Bilateral - TRANSRECTAL NEEDLE BIOPSY PROSTATE (TRNBP)    Specimen(s):  ID Type Source Tests Collected by Time Destination   1 : Right Base Lateral Tissue Prostate TISSUE EXAM Frank D'Amico, MD 3/26/2025  2:28 PM    2 : right base medial Tissue Prostate TISSUE EXAM Frank D'Amico, MD 3/26/2025  1:54 PM    3 : right mid lateral Tissue Prostate TISSUE EXAM Frank D'Amico, MD 3/26/2025  2:29 PM    4 : right mid zone medial Tissue Prostate TISSUE EXAM Frank D'Amico, MD 3/26/2025  2:30 PM    5 : right apex lateral Tissue Prostate TISSUE EXAM Frank D'Amico, MD 3/26/2025  2:30 PM    6 : right apex medial Tissue Prostate TISSUE EXAM Frank D'Amico, MD 3/26/2025  2:31 PM    7 : left base lateral Tissue Prostate TISSUE EXAM Frank D'Amico, MD 3/26/2025  2:31 PM    8 : left base medial Tissue Prostate TISSUE EXAM Frank D'Amico, MD 3/26/2025  2:31 PM    9 : left mid lateral Tissue Prostate TISSUE EXAM Frank D'Amico, MD 3/26/2025  2:32 PM    10 : left mid zone medial Tissue Prostate TISSUE EXAM Frank D'Amico, MD 3/26/2025  1:54 PM    11 : left apex lateral Tissue Prostate TISSUE EXAM Frank D'Amico, MD 3/26/2025  2:35 PM    12 : left apex medial Tissue Prostate TISSUE EXAM Frank D'Amico, MD 3/26/2025  2:35 PM        Estimated Blood Loss:   Minimal    Drains:  * No LDAs found *    Anesthesia Type:   IV Sedation with Anesthesia    Operative Indications:  Elevated PSA [R97.20]  Abnormal MRI of the prostate    Operative Findings:  Prostate volume 60.57 cc.  PSA density 0.18.      Complications:   None    Procedure and Technique:  Patient brought the operating room.  Lateral decubitus position with the left side down.  Patient  sedated adequately.  Hard timeout.  SCDs in place.  Antibiotics on board.  Transrectal ultrasound was assembled and placed transrectally with well lubrication.  Images of the prostate were taken.  No significant abnormalities were noted on ultrasound.  Prostate volume was calculated be 60.57 cc.  Length of the prostate was 5.4 cm width of the prostate was 5.3 cm height of the prostate was 4.0 cm.  Ultrasound was then used to perform sextant biopsies from the lateral and medial portion of each sextant.  Biopsies were labeled and sent separately to the pathologist.  There were a total of 12 areas biopsied.  The abnormal area on the prostate was in the left mid medial peripheral area.  Multiple biopsies were taken from that area with a spring-loaded biopsy device.  The sound was used to guide the biopsies in this area and all the areas again.  Patient tolerated seizure well no blood was noted.  He was given a dose of oral antibiotics in the recovery room.  He will follow-up in the office to discuss results.  Planned discharge.   I was present for the entire procedure.    Patient Disposition:  PACU              SIGNATURE: Frank D'Amico, MD  DATE: March 26, 2025  TIME: 2:53 PM

## 2025-03-27 PROCEDURE — G0416 PROSTATE BIOPSY, ANY MTHD: HCPCS | Performed by: PATHOLOGY

## 2025-04-01 ENCOUNTER — TELEPHONE (OUTPATIENT)
Dept: CARDIOLOGY CLINIC | Facility: CLINIC | Age: 65
End: 2025-04-01

## 2025-04-01 NOTE — TELEPHONE ENCOUNTER
Lm for pt to call and let us know what his insur is.  The insur we have on file is no longer active. When pt calls please enter his new insur

## 2025-04-03 ENCOUNTER — OFFICE VISIT (OUTPATIENT)
Dept: UROLOGY | Facility: CLINIC | Age: 65
End: 2025-04-03
Payer: COMMERCIAL

## 2025-04-03 VITALS
WEIGHT: 264.4 LBS | BODY MASS INDEX: 37.02 KG/M2 | DIASTOLIC BLOOD PRESSURE: 90 MMHG | TEMPERATURE: 97.9 F | RESPIRATION RATE: 96 BRPM | SYSTOLIC BLOOD PRESSURE: 128 MMHG | HEART RATE: 82 BPM | HEIGHT: 71 IN

## 2025-04-03 DIAGNOSIS — R97.20 ELEVATED PSA: Primary | ICD-10-CM

## 2025-04-03 DIAGNOSIS — R35.1 BENIGN PROSTATIC HYPERPLASIA WITH NOCTURIA: ICD-10-CM

## 2025-04-03 DIAGNOSIS — N40.1 BENIGN PROSTATIC HYPERPLASIA WITH NOCTURIA: ICD-10-CM

## 2025-04-03 PROCEDURE — 99213 OFFICE O/P EST LOW 20 MIN: CPT | Performed by: UROLOGY

## 2025-04-03 RX ORDER — TADALAFIL 5 MG/1
5 TABLET ORAL DAILY PRN
Qty: 90 TABLET | Refills: 3 | Status: SHIPPED | OUTPATIENT
Start: 2025-04-03

## 2025-04-03 NOTE — PROGRESS NOTES
UROLOGY PROGRESS NOTE         NAME: Terry Etzweiler  AGE: 64 y.o. SEX: male  : 1960   MRN: [id number]    DATE: 4/3/2025  TIME: 4:26 PM    Assessment and Plan      Impression:   1. Elevated PSA  2. Benign prostatic hyperplasia with nocturia  No evidence of prostate cancer on his biopsies.  There was inflammation likely the cause of his elevated PSA.     Plan: We will see him in 12 months he will continue the tadalafil 5 mg daily.  We will check his PSA twice a year.      Chief Complaint     Chief Complaint   Patient presents with    Follow-up     History of Present Illness     HPI: Terry Etzweiler is a 64 y.o. year old male who presents with history of BPH erectile dysfunction.  He is on tadalafil 5 mg daily which does help.  He underwent biopsies of his prostate recently for the second time.  There was no evidence of cancer.  He had a PI-RADS score of 3 on his prostate MRI.  This area was biopsied 3 times with a cognitive fusion.  Inflammation was noted on his biopsies.  PSA has been as high as 12.  He is doing well clinically.  No hematuria.              The following portions of the patient's history were reviewed and updated as appropriate: allergies, current medications, past family history, past medical history, past social history, past surgical history and problem list.  Past Medical History:   Diagnosis Date    Benign prostatic hyperplasia     Elevated PSA     High cholesterol     Hypertension      Past Surgical History:   Procedure Laterality Date    ARTHROSCOPIC REPAIR ACL Left     COLONOSCOPY W/ POLYPECTOMY      PA PROSTATE NEEDLE BIOPSY ANY APPROACH Bilateral 3/26/2025    Procedure: TRANSRECTAL NEEDLE BIOPSY PROSTATE (TRNBP);  Surgeon: Frank D'Amico, MD;  Location:  MAIN OR;  Service: Urology    US GUIDED PROSTATE BIOPSY  3/26/2025     shoulder  Review of Systems     Const: Denies chills, fever and weight loss.  CV: Denies chest pain.  Resp: Denies SOB.  GI: Denies abdominal pain,  "nausea and vomiting.  : Denies symptoms other than stated above.  Musculo: Denies back pain.    Objective   /90   Pulse 82   Temp 97.9 °F (36.6 °C)   Resp (!) 96   Ht 5' 11\" (1.803 m)   Wt 120 kg (264 lb 6.4 oz)   BMI 36.88 kg/m²     Physical Exam  Const: Appears healthy and well developed. No signs of acute distress present.  Resp: Respirations are regular and unlabored.   Psych: Patient's attitude is cooperative. Mood is normal. Affect is normal.    Current Medications     Current Outpatient Medications:     atorvastatin (LIPITOR) 20 mg tablet, Take 20 mg by mouth daily, Disp: , Rfl:     lisinopril-hydrochlorothiazide (PRINZIDE,ZESTORETIC) 10-12.5 MG per tablet, Take 1 tablet by mouth daily, Disp: , Rfl:     tadalafil (CIALIS) 5 MG tablet, Take 1 tablet (5 mg total) by mouth daily as needed for erectile dysfunction, Disp: 90 tablet, Rfl: 3        Frank D'Amico, MD        "

## 2025-04-28 ENCOUNTER — TELEPHONE (OUTPATIENT)
Age: 65
End: 2025-04-28

## 2025-04-28 NOTE — TELEPHONE ENCOUNTER
Patient of DR. D'amico at [facility] from [facility] called stating patient is schedule for MRI of prostate for tomorrow 04/29/25  and patient needed to get labs done prior and they have called him several times and he has not responded.  She stated this happened before and he refused to do the labs.  They need to check his kidney functions prior to doing the MRI.  Please review and call them to see how to proceed. If they do not get a response today, they will need to cancel the MRI scheduled for tomorrow.       Phone number to call is [phone] ask [facility] .

## 2025-04-28 NOTE — TELEPHONE ENCOUNTER
Left message for patient to call the office back to verify that he knows he will need to have the lab work before his MRI. Awaiting return call. Labs were ordered for patient to have done.

## 2025-04-28 NOTE — TELEPHONE ENCOUNTER
Spoke with [facility] and made them aware that patient already had his MRI done at [facility] in February and not needed at this time. Appointment was canceled. Patient made aware that appointment was canceled. He verbalized understanding of message.

## 2025-06-30 ENCOUNTER — TELEPHONE (OUTPATIENT)
Dept: UROLOGY | Facility: AMBULATORY SURGERY CENTER | Age: 65
End: 2025-06-30

## (undated) DEVICE — GLOVE SRG BIOGEL 6.5

## (undated) DEVICE — MAX-CORE® DISPOSABLE CORE BIOPSY INSTRUMENT, 18G X 25CM: Brand: MAX-CORE

## (undated) DEVICE — PREP PAD BNS: Brand: CONVERTORS

## (undated) DEVICE — TELFA NON-ADHERENT ABSORBENT DRESSING: Brand: TELFA

## (undated) DEVICE — COVER PROBE ULTRASOUND

## (undated) DEVICE — PROBE ULTRASOUND TRIPLANE TRANSRECTAL

## (undated) DEVICE — COVER PROBE ECLIPSE

## (undated) DEVICE — UTILITY MARKER,BLACK WITH LABELS: Brand: DEVON

## (undated) DEVICE — DRAPE SHEET THREE QUARTER

## (undated) DEVICE — MAYO STAND COVER: Brand: CONVERTORS